# Patient Record
Sex: FEMALE | Race: WHITE | NOT HISPANIC OR LATINO | URBAN - METROPOLITAN AREA
[De-identification: names, ages, dates, MRNs, and addresses within clinical notes are randomized per-mention and may not be internally consistent; named-entity substitution may affect disease eponyms.]

---

## 2024-11-30 ENCOUNTER — OFFICE VISIT (OUTPATIENT)
Dept: URGENT CARE | Facility: CLINIC | Age: 29
End: 2024-11-30
Payer: COMMERCIAL

## 2024-11-30 VITALS
TEMPERATURE: 98 F | BODY MASS INDEX: 27.4 KG/M2 | WEIGHT: 185 LBS | HEIGHT: 69 IN | OXYGEN SATURATION: 97 % | DIASTOLIC BLOOD PRESSURE: 68 MMHG | HEART RATE: 74 BPM | RESPIRATION RATE: 17 BRPM | SYSTOLIC BLOOD PRESSURE: 113 MMHG

## 2024-11-30 DIAGNOSIS — B96.89 ACUTE BACTERIAL SINUSITIS: Primary | ICD-10-CM

## 2024-11-30 DIAGNOSIS — R05.9 COUGH, UNSPECIFIED TYPE: ICD-10-CM

## 2024-11-30 DIAGNOSIS — J01.90 ACUTE BACTERIAL SINUSITIS: Primary | ICD-10-CM

## 2024-11-30 LAB
CTP QC/QA: YES
CTP QC/QA: YES
POC MOLECULAR INFLUENZA A AGN: NEGATIVE
POC MOLECULAR INFLUENZA B AGN: NEGATIVE
SARS-COV-2 AG RESP QL IA.RAPID: NEGATIVE

## 2024-11-30 PROCEDURE — 87811 SARS-COV-2 COVID19 W/OPTIC: CPT | Mod: QW,S$GLB,, | Performed by: NURSE PRACTITIONER

## 2024-11-30 PROCEDURE — 87502 INFLUENZA DNA AMP PROBE: CPT | Mod: QW,S$GLB,, | Performed by: NURSE PRACTITIONER

## 2024-11-30 PROCEDURE — 99204 OFFICE O/P NEW MOD 45 MIN: CPT | Mod: S$GLB,,, | Performed by: NURSE PRACTITIONER

## 2024-11-30 RX ORDER — DEXTROAMPHETAMINE SACCHARATE, AMPHETAMINE ASPARTATE, DEXTROAMPHETAMINE SULFATE AND AMPHETAMINE SULFATE 5; 5; 5; 5 MG/1; MG/1; MG/1; MG/1
1 TABLET ORAL 2 TIMES DAILY
COMMUNITY
Start: 2024-11-19

## 2024-11-30 RX ORDER — PROPRANOLOL HYDROCHLORIDE 20 MG/1
TABLET ORAL
COMMUNITY
Start: 2024-02-03

## 2024-11-30 RX ORDER — FLUOXETINE HYDROCHLORIDE 40 MG/1
2 CAPSULE ORAL EVERY MORNING
COMMUNITY
Start: 2024-02-12

## 2024-11-30 RX ORDER — AMOXICILLIN AND CLAVULANATE POTASSIUM 875; 125 MG/1; MG/1
1 TABLET, FILM COATED ORAL 2 TIMES DAILY
Qty: 14 TABLET | Refills: 0 | Status: SHIPPED | OUTPATIENT
Start: 2024-11-30 | End: 2024-12-07

## 2024-11-30 RX ORDER — DEXTROAMPHETAMINE SACCHARATE, AMPHETAMINE ASPARTATE MONOHYDRATE, DEXTROAMPHETAMINE SULFATE AND AMPHETAMINE SULFATE 5; 5; 5; 5 MG/1; MG/1; MG/1; MG/1
CAPSULE, EXTENDED RELEASE ORAL EVERY MORNING
COMMUNITY

## 2024-11-30 NOTE — PATIENT INSTRUCTIONS
- You must understand that you have received an Urgent Care treatment only and that you may be released before all of your medical problems are known or treated.   - You, the patient, will arrange for follow up care as instructed.   - If your condition worsens or fails to improve we recommend that you receive another evaluation at the ER immediately or contact your PCP to discuss your concerns.   - You can call [phone] or [phone] to help schedule an appointment with the appropriate provider.    Drink plenty of fluids   Get lots of rest  Tylenol or ibuprofen for pain/fever  Mucinex DM for cough  Saline nasal rinses to irrigate sinus cavities  Warm salt water gargles for sore throat

## 2024-11-30 NOTE — PROGRESS NOTES
"Subjective:      Patient ID: Laury Ramirez is a 29 y.o. female.    Vitals:  height is 5' 9" (1.753 m) and weight is 83.9 kg (185 lb). Her tympanic temperature is 98.1 °F (36.7 °C). Her blood pressure is 113/68 and her pulse is 74. Her respiration is 17 and oxygen saturation is 97%.     Chief Complaint: Sinus Problem    Pt is a 28 yo female w/ c/o sinus congestion/pressure/pain, productive cough. Sx began Wednesday and significantly worsened yesterday. Tx w/ decongestants which expelled thick brown mucus. Her roommate is sick with similar, but less severe symptoms.     Sinus Problem  This is a new problem. The current episode started in the past 7 days. The problem has been rapidly worsening since onset. There has been no fever. Associated symptoms include congestion, coughing, headaches, sinus pressure and a sore throat. Pertinent negatives include no shortness of breath. Past treatments include oral decongestants. The treatment provided mild relief.       HENT:  Positive for congestion, sinus pressure and sore throat.    Respiratory:  Positive for cough. Negative for shortness of breath.    Neurological:  Positive for headaches.      Objective:     Physical Exam   Constitutional: She is oriented to person, place, and time. She appears well-developed. She is cooperative.  Non-toxic appearance. She does not appear ill. No distress.   HENT:   Head: Normocephalic and atraumatic.   Ears:   Right Ear: Hearing, tympanic membrane, external ear and ear canal normal.   Left Ear: Hearing, tympanic membrane, external ear and ear canal normal.   Nose: No mucosal edema, rhinorrhea or nasal deformity. No epistaxis. Right sinus exhibits maxillary sinus tenderness. Right sinus exhibits no frontal sinus tenderness. Left sinus exhibits maxillary sinus tenderness. Left sinus exhibits no frontal sinus tenderness.   Mouth/Throat: Uvula is midline, oropharynx is clear and moist and mucous membranes are normal. No trismus in the " jaw. Normal dentition. No uvula swelling. No oropharyngeal exudate, posterior oropharyngeal edema or posterior oropharyngeal erythema.   Eyes: Conjunctivae and lids are normal. No scleral icterus.   Neck: Trachea normal and phonation normal. Neck supple. No edema present. No erythema present. No neck rigidity present.   Cardiovascular: Normal rate, regular rhythm, normal heart sounds and normal pulses.   Pulmonary/Chest: Effort normal and breath sounds normal. No respiratory distress. She has no decreased breath sounds. She has no wheezes. She has no rhonchi.   Abdominal: Normal appearance.   Musculoskeletal: Normal range of motion.         General: No deformity. Normal range of motion.   Neurological: She is alert and oriented to person, place, and time. She exhibits normal muscle tone. Coordination normal.   Skin: Skin is warm, dry, intact, not diaphoretic and not pale.   Psychiatric: Her speech is normal and behavior is normal. Judgment and thought content normal.   Nursing note and vitals reviewed.    Results for orders placed or performed in visit on 11/30/24   SARS Coronavirus 2 Antigen, POCT Manual Read    Collection Time: 11/30/24 12:38 PM   Result Value Ref Range    SARS Coronavirus 2 Antigen Negative Negative     Acceptable Yes    POCT Influenza A/B MOLECULAR    Collection Time: 11/30/24 12:38 PM   Result Value Ref Range    POC Molecular Influenza A Ag Negative Negative    POC Molecular Influenza B Ag Negative Negative     Acceptable Yes      Assessment:     1. Acute bacterial sinusitis    2. Cough, unspecified type        Plan:       Acute bacterial sinusitis  -     amoxicillin-clavulanate 875-125mg (AUGMENTIN) 875-125 mg per tablet; Take 1 tablet by mouth 2 (two) times daily. for 7 days  Dispense: 14 tablet; Refill: 0    Cough, unspecified type  -     SARS Coronavirus 2 Antigen, POCT Manual Read  -     POCT Influenza A/B MOLECULAR      Patient Instructions   - You must  understand that you have received an Urgent Care treatment only and that you may be released before all of your medical problems are known or treated.   - You, the patient, will arrange for follow up care as instructed.   - If your condition worsens or fails to improve we recommend that you receive another evaluation at the ER immediately or contact your PCP to discuss your concerns.   - You can call [phone] or [phone] to help schedule an appointment with the appropriate provider.    Drink plenty of fluids   Get lots of rest  Tylenol or ibuprofen for pain/fever  Mucinex DM for cough  Saline nasal rinses to irrigate sinus cavities  Warm salt water gargles for sore throat

## 2025-01-06 ENCOUNTER — OFFICE VISIT (OUTPATIENT)
Dept: URGENT CARE | Facility: CLINIC | Age: 30
End: 2025-01-06
Payer: COMMERCIAL

## 2025-01-06 VITALS
OXYGEN SATURATION: 96 % | HEIGHT: 69 IN | RESPIRATION RATE: 18 BRPM | BODY MASS INDEX: 28.14 KG/M2 | HEART RATE: 61 BPM | SYSTOLIC BLOOD PRESSURE: 139 MMHG | TEMPERATURE: 99 F | WEIGHT: 190 LBS | DIASTOLIC BLOOD PRESSURE: 63 MMHG

## 2025-01-06 DIAGNOSIS — B96.89 ACUTE BACTERIAL SINUSITIS: Primary | ICD-10-CM

## 2025-01-06 DIAGNOSIS — J34.89 RHINORRHEA: ICD-10-CM

## 2025-01-06 DIAGNOSIS — J01.90 ACUTE BACTERIAL SINUSITIS: Primary | ICD-10-CM

## 2025-01-06 DIAGNOSIS — R05.9 COUGH, UNSPECIFIED TYPE: ICD-10-CM

## 2025-01-06 LAB
CTP QC/QA: YES
SARS-COV-2 AG RESP QL IA.RAPID: NEGATIVE

## 2025-01-06 PROCEDURE — 99214 OFFICE O/P EST MOD 30 MIN: CPT | Mod: S$GLB,,, | Performed by: NURSE PRACTITIONER

## 2025-01-06 PROCEDURE — 87811 SARS-COV-2 COVID19 W/OPTIC: CPT | Mod: QW,S$GLB,, | Performed by: NURSE PRACTITIONER

## 2025-01-06 PROCEDURE — 71046 X-RAY EXAM CHEST 2 VIEWS: CPT | Mod: S$GLB,,, | Performed by: RADIOLOGY

## 2025-01-06 NOTE — PATIENT INSTRUCTIONS
- You must understand that you have received an Urgent Care treatment only and that you may be released before all of your medical problems are known or treated.   - You, the patient, will arrange for follow up care as instructed.   - If your condition worsens or fails to improve we recommend that you receive another evaluation at the ER immediately or contact your PCP to discuss your concerns.   - You can call [phone] or [phone] to help schedule an appointment with the appropriate provider.    Drink plenty of fluids   Get lots of rest  Tylenol or ibuprofen for pain/fever  Mucinex DM for cough  Saline nasal rinses to irrigate sinus cavities  Warm salt water gargles for sore throat

## 2025-01-06 NOTE — PROGRESS NOTES
"Subjective:      Patient ID: Laury Ramirez is a 29 y.o. female.    Vitals:  height is 5' 9" (1.753 m) and weight is 86.2 kg (190 lb). Her oral temperature is 98.5 °F (36.9 °C). Her blood pressure is 139/63 and her pulse is 61. Her respiration is 18 and oxygen saturation is 96%.     Chief Complaint: Sinus Problem    Pt is a 29 y.o female who presents to clinic with c/o sinus issues, cough with mucus, fatigue, sore throat, and nasal discharge. Pt reports being seen in Nov for the same thing, symptoms improved, and sx came back Dec 28. Pt states taking mucinex and nasal saline rinse. Pt reports cough being worse at bedtime.     Sinus Problem  This is a recurrent problem. The current episode started in the past 7 days. The problem has been gradually improving since onset. Her pain is at a severity of 3/10. The pain is mild. Associated symptoms include congestion, coughing, ear pain, headaches, sinus pressure, a sore throat and swollen glands. Pertinent negatives include no chills, diaphoresis, hoarse voice, neck pain, shortness of breath or sneezing. Past treatments include saline nose sprays and oral decongestants. The treatment provided moderate relief.       Constitution: Negative for chills and sweating.   HENT:  Positive for ear pain, congestion, sinus pressure and sore throat.    Neck: Negative for neck pain.   Respiratory:  Positive for cough. Negative for shortness of breath.    Allergic/Immunologic: Negative for sneezing.   Neurological:  Positive for headaches.      Objective:     Physical Exam   Constitutional: She is oriented to person, place, and time. She appears well-developed. She is cooperative.  Non-toxic appearance. She does not appear ill. No distress.   HENT:   Head: Normocephalic and atraumatic.   Ears:   Right Ear: Hearing, tympanic membrane, external ear and ear canal normal.   Left Ear: Hearing, tympanic membrane, external ear and ear canal normal.   Nose: Rhinorrhea present. No mucosal " edema or nasal deformity. No epistaxis. Right sinus exhibits maxillary sinus tenderness. Right sinus exhibits no frontal sinus tenderness. Left sinus exhibits maxillary sinus tenderness. Left sinus exhibits no frontal sinus tenderness.   Mouth/Throat: Uvula is midline and mucous membranes are normal. No trismus in the jaw. Normal dentition. No uvula swelling. Posterior oropharyngeal erythema present. No oropharyngeal exudate or posterior oropharyngeal edema.   Eyes: Conjunctivae and lids are normal. No scleral icterus.   Neck: Trachea normal and phonation normal. Neck supple. No edema present. No erythema present. No neck rigidity present.   Cardiovascular: Normal rate, regular rhythm, normal heart sounds and normal pulses.   Pulmonary/Chest: Effort normal. No respiratory distress. She has no decreased breath sounds. She has wheezes. She has rhonchi in the right lower field.   Abdominal: Normal appearance.   Musculoskeletal: Normal range of motion.         General: No deformity. Normal range of motion.   Neurological: She is alert and oriented to person, place, and time. She exhibits normal muscle tone. Coordination normal.   Skin: Skin is warm, dry, intact, not diaphoretic and not pale.   Psychiatric: Her speech is normal and behavior is normal. Judgment and thought content normal.   Nursing note and vitals reviewed.    XR CHEST PA AND LATERAL    Result Date: 1/6/2025  EXAMINATION: XR CHEST PA AND LATERAL CLINICAL HISTORY: Acute cough TECHNIQUE: PA and lateral views of the chest were performed. COMPARISON: None FINDINGS: Lungs are clear. No focal consolidation. No pleural effusion. No pneumothorax. Normal heart size.     As above Electronically signed by: Charanjit Elkins Date:    01/06/2025 Time:    17:20     Assessment:     1. Acute bacterial sinusitis    2. Cough, unspecified type    3. Rhinorrhea        Plan:       Acute bacterial sinusitis    Cough, unspecified type  -     SARS Coronavirus 2 Antigen, POCT  Manual Read  -     XR CHEST PA AND LATERAL; Future; Expected date: 01/06/2025    Rhinorrhea      Patient Instructions   - You must understand that you have received an Urgent Care treatment only and that you may be released before all of your medical problems are known or treated.   - You, the patient, will arrange for follow up care as instructed.   - If your condition worsens or fails to improve we recommend that you receive another evaluation at the ER immediately or contact your PCP to discuss your concerns.   - You can call [phone] or [phone] to help schedule an appointment with the appropriate provider.    Drink plenty of fluids   Get lots of rest  Tylenol or ibuprofen for pain/fever  Mucinex DM for cough  Saline nasal rinses to irrigate sinus cavities  Warm salt water gargles for sore throat

## 2025-01-07 ENCOUNTER — TELEPHONE (OUTPATIENT)
Dept: URGENT CARE | Facility: CLINIC | Age: 30
End: 2025-01-07
Payer: COMMERCIAL

## 2025-01-07 NOTE — TELEPHONE ENCOUNTER
Per patient, during visit doxycyline was discussed for patients symptoms.   Pt is asking that it be sent to pharmacy on file

## 2025-01-08 ENCOUNTER — OFFICE VISIT (OUTPATIENT)
Dept: URGENT CARE | Facility: CLINIC | Age: 30
End: 2025-01-08
Payer: COMMERCIAL

## 2025-01-08 VITALS
WEIGHT: 190.06 LBS | OXYGEN SATURATION: 100 % | SYSTOLIC BLOOD PRESSURE: 121 MMHG | HEART RATE: 66 BPM | RESPIRATION RATE: 20 BRPM | HEIGHT: 69 IN | TEMPERATURE: 98 F | DIASTOLIC BLOOD PRESSURE: 68 MMHG | BODY MASS INDEX: 28.15 KG/M2

## 2025-01-08 DIAGNOSIS — J01.90 ACUTE BACTERIAL SINUSITIS: Primary | ICD-10-CM

## 2025-01-08 DIAGNOSIS — B96.89 ACUTE BACTERIAL SINUSITIS: Primary | ICD-10-CM

## 2025-01-08 PROCEDURE — 99499 UNLISTED E&M SERVICE: CPT | Mod: S$GLB,,, | Performed by: FAMILY MEDICINE

## 2025-01-08 RX ORDER — DOXYCYCLINE 100 MG/1
100 CAPSULE ORAL 2 TIMES DAILY
Qty: 14 CAPSULE | Refills: 0 | Status: SHIPPED | OUTPATIENT
Start: 2025-01-08 | End: 2025-01-15

## 2025-01-08 RX ORDER — FLUTICASONE PROPIONATE 50 MCG
1 SPRAY, SUSPENSION (ML) NASAL DAILY
Qty: 9.9 ML | Refills: 0 | Status: SHIPPED | OUTPATIENT
Start: 2025-01-08

## 2025-01-08 NOTE — LETTER
January 8, 2025      [facility] [address] [address]  Phone: [phone]  Fax: [phone]       Patient: Laury Ramirez   YOB: 1995  Date of Visit: 01/08/2025    To Whom It May Concern:    Jerardo Ramirez  was at [facility] on 01/08/2025. The patient may return to work/school on 01/09/2025 with no restrictions. If you have any questions or concerns, or if I can be of further assistance, please do not hesitate to contact me.    Sincerely,          Mraco Mariano MD

## 2025-01-08 NOTE — PROGRESS NOTES
"Subjective:      Patient ID: Laury Ramirez is a 29 y.o. female.    Vitals:  height is 5' 9" (1.753 m) and weight is 86.2 kg (190 lb 0.6 oz). Her oral temperature is 98.1 °F (36.7 °C). Her blood pressure is 121/68 and her pulse is 66. Her respiration is 20 and oxygen saturation is 100%.     Chief Complaint: Sinus Problem    30 yo presents with cough, congestion, wheezing, and headaches. Pt states she started to have sx on 12/28/24. Pt states she had a sinus infection the week of 11/28/24, was seen got some antibiotics and got better then got worst again. Pt states her mucus is green/brownish colors.   REVIEWED CHART MATERIAL - PATIENT WAS TO HAVE PRESCRIPTION PRIOR TO DISCHARGE AND CALLS TO CLINIC DID NOT RESOLVE ISSUE    Sinus Problem  This is a recurrent problem. The current episode started 1 to 4 weeks ago. The problem is unchanged. There has been no fever. Her pain is at a severity of 2/10. The pain is mild. Associated symptoms include congestion, coughing, ear pain, headaches, a hoarse voice, sinus pressure and sneezing. Past treatments include antibiotics and oral decongestants. The treatment provided no relief.       HENT:  Positive for ear pain, congestion and sinus pressure.    Respiratory:  Positive for cough.    Allergic/Immunologic: Positive for sneezing.   Neurological:  Positive for headaches.      Objective:     Physical Exam   Constitutional: She does not appear ill. No distress. normal  HENT:   Head: Normocephalic and atraumatic.   Nose: Rhinorrhea (purulent) and congestion present.   Cardiovascular: Normal rate, regular rhythm, normal heart sounds and normal pulses.   Pulmonary/Chest: Effort normal and breath sounds normal.   Abdominal: Normal appearance.   Neurological: She is alert.   Nursing note and vitals reviewed.    Results for orders placed or performed in visit on 01/06/25   SARS Coronavirus 2 Antigen, POCT Manual Read    Collection Time: 01/06/25  4:28 PM   Result Value Ref Range "    SARS Coronavirus 2 Antigen Negative Negative     Acceptable Yes         Assessment:     1. Acute bacterial sinusitis        Plan:       Acute bacterial sinusitis  -     doxycycline (VIBRAMYCIN) 100 MG Cap; Take 1 capsule (100 mg total) by mouth 2 (two) times daily. for 7 days  Dispense: 14 capsule; Refill: 0  -     fluticasone propionate (FLONASE) 50 mcg/actuation nasal spray; 1 spray (50 mcg total) by Each Nostril route once daily.  Dispense: 9.9 mL; Refill: 0